# Patient Record
Sex: FEMALE | Race: WHITE | Employment: FULL TIME | URBAN - METROPOLITAN AREA
[De-identification: names, ages, dates, MRNs, and addresses within clinical notes are randomized per-mention and may not be internally consistent; named-entity substitution may affect disease eponyms.]

---

## 2021-01-02 ENCOUNTER — VIRTUAL VISIT (OUTPATIENT)
Dept: PRIMARY CARE CLINIC | Age: 56
End: 2021-01-02

## 2021-01-02 DIAGNOSIS — Z20.822 EXPOSURE TO COVID-19 VIRUS: Primary | ICD-10-CM

## 2021-01-02 PROCEDURE — 99213 OFFICE O/P EST LOW 20 MIN: CPT | Performed by: PHYSICIAN ASSISTANT

## 2021-01-02 NOTE — PROGRESS NOTES
Subjective:      Patient ID: Severiano Paula is a 63 y.o. female who presents today for:  Chief Complaint   Patient presents with    Concern For COVID-19       Pt consents to this telephone/video encounter and understands that examination is limited due to technology. 15 minutes were spent reviewing patient's records, test results, medications and allergies as well as ROS, counseling pt and explaining necessary plan of care during encounter. Pt endorses body aches, chills, mild HA x 2 days  Picked up son on Monday w/sx no mask, ? Exposure - after travel out of the country  No SOB, cough        Past Medical History:   Diagnosis Date    Anxiety     Chicken pox     Chronic back pain     Hypertension      Past Surgical History:   Procedure Laterality Date    SPINE SURGERY  1981    spinal fusion     No family history on file.   Social History     Socioeconomic History    Marital status:      Spouse name: Not on file    Number of children: Not on file    Years of education: Not on file    Highest education level: Not on file   Occupational History    Not on file   Social Needs    Financial resource strain: Not on file    Food insecurity     Worry: Not on file     Inability: Not on file    Transportation needs     Medical: Not on file     Non-medical: Not on file   Tobacco Use    Smoking status: Never Smoker   Substance and Sexual Activity    Alcohol use: Not on file    Drug use: Not on file    Sexual activity: Not on file   Lifestyle    Physical activity     Days per week: Not on file     Minutes per session: Not on file    Stress: Not on file   Relationships    Social connections     Talks on phone: Not on file     Gets together: Not on file     Attends Congregational service: Not on file     Active member of club or organization: Not on file     Attends meetings of clubs or organizations: Not on file     Relationship status: Not on file    Intimate partner violence Fear of current or ex partner: Not on file     Emotionally abused: Not on file     Physically abused: Not on file     Forced sexual activity: Not on file   Other Topics Concern    Not on file   Social History Narrative    Not on file     Current Outpatient Medications on File Prior to Visit   Medication Sig Dispense Refill    losartan (COZAAR) 25 MG tablet Take 25 mg by mouth daily      citalopram (CELEXA) 20 MG tablet Take 20 mg by mouth daily       No current facility-administered medications on file prior to visit. Patient has no known allergies. Review of Systems   Constitutional: Positive for chills. Respiratory: Negative for cough and shortness of breath. Neurological: Positive for headaches. All other systems reviewed and are negative. Objective: There were no vitals taken for this visit. Physical Exam  Vitals reviewed: exam deferred 2/2 telemedicine encounter. Assessment:       Diagnosis Orders   1. Exposure to COVID-19 virus         Plan:      Orders Placed This Encounter   Procedures    Covid-19 Ambulatory       No orders of the defined types were placed in this encounter. Return for follow up w/PCP in 1-2 weeks. Reviewed with the patient: current clinicalstatus, medications, activities and diet. Side effects, adverse effects of the medication prescribedtoday, as well as treatment plan/ rationale and result expectations have been discussedwith the patient who expresses understanding and desires to proceed. Close follow upto evaluate treatment results and for coordination of care. I have reviewedthe patient's medical history in detail and updated the computerized patient record.     Pat Hunter PA-C

## 2021-01-05 LAB
SARS-COV-2: NOT DETECTED
SOURCE: NORMAL

## 2021-01-22 ASSESSMENT — ENCOUNTER SYMPTOMS
COUGH: 0
SHORTNESS OF BREATH: 0

## 2023-02-27 ENCOUNTER — HOSPITAL ENCOUNTER (OUTPATIENT)
Dept: WOMENS IMAGING | Age: 58
Discharge: HOME | End: 2023-03-01
Payer: COMMERCIAL

## 2023-02-27 DIAGNOSIS — Z12.31 ENCOUNTER FOR SCREENING MAMMOGRAM FOR MALIGNANT NEOPLASM OF BREAST: ICD-10-CM

## 2023-02-27 PROCEDURE — 77063 BREAST TOMOSYNTHESIS BI: CPT

## 2024-03-22 ENCOUNTER — OFFICE VISIT (OUTPATIENT)
Dept: FAMILY MEDICINE CLINIC | Age: 59
End: 2024-03-22
Payer: COMMERCIAL

## 2024-03-22 VITALS
DIASTOLIC BLOOD PRESSURE: 78 MMHG | HEIGHT: 68 IN | HEART RATE: 78 BPM | SYSTOLIC BLOOD PRESSURE: 132 MMHG | WEIGHT: 134 LBS | BODY MASS INDEX: 20.31 KG/M2 | TEMPERATURE: 97.1 F | OXYGEN SATURATION: 98 %

## 2024-03-22 DIAGNOSIS — G89.29 CHRONIC LEFT-SIDED LOW BACK PAIN WITH LEFT-SIDED SCIATICA: Primary | ICD-10-CM

## 2024-03-22 DIAGNOSIS — M54.42 CHRONIC LEFT-SIDED LOW BACK PAIN WITH LEFT-SIDED SCIATICA: Primary | ICD-10-CM

## 2024-03-22 PROCEDURE — G8484 FLU IMMUNIZE NO ADMIN: HCPCS | Performed by: PHYSICIAN ASSISTANT

## 2024-03-22 PROCEDURE — 96372 THER/PROPH/DIAG INJ SC/IM: CPT | Performed by: PHYSICIAN ASSISTANT

## 2024-03-22 PROCEDURE — 3017F COLORECTAL CA SCREEN DOC REV: CPT | Performed by: PHYSICIAN ASSISTANT

## 2024-03-22 PROCEDURE — 99203 OFFICE O/P NEW LOW 30 MIN: CPT | Performed by: PHYSICIAN ASSISTANT

## 2024-03-22 PROCEDURE — G8420 CALC BMI NORM PARAMETERS: HCPCS | Performed by: PHYSICIAN ASSISTANT

## 2024-03-22 PROCEDURE — 1036F TOBACCO NON-USER: CPT | Performed by: PHYSICIAN ASSISTANT

## 2024-03-22 PROCEDURE — G8427 DOCREV CUR MEDS BY ELIG CLIN: HCPCS | Performed by: PHYSICIAN ASSISTANT

## 2024-03-22 RX ORDER — CYCLOBENZAPRINE HCL 5 MG
TABLET ORAL
Qty: 30 TABLET | Refills: 0 | Status: SHIPPED | OUTPATIENT
Start: 2024-03-22

## 2024-03-22 RX ORDER — OLMESARTAN MEDOXOMIL AND HYDROCHLOROTHIAZIDE 20/12.5 20; 12.5 MG/1; MG/1
1 TABLET ORAL DAILY
COMMUNITY
Start: 2023-08-08 | End: 2024-08-07

## 2024-03-22 RX ORDER — KETOROLAC TROMETHAMINE 30 MG/ML
60 INJECTION, SOLUTION INTRAMUSCULAR; INTRAVENOUS ONCE
Status: COMPLETED | OUTPATIENT
Start: 2024-03-22 | End: 2024-03-22

## 2024-03-22 RX ORDER — AMITRIPTYLINE HYDROCHLORIDE 25 MG/1
25 TABLET, FILM COATED ORAL NIGHTLY
COMMUNITY
Start: 2023-05-16

## 2024-03-22 RX ORDER — ESTRADIOL 0.1 MG/G
1 CREAM VAGINAL
COMMUNITY
Start: 2023-05-09

## 2024-03-22 RX ORDER — PREDNISONE 10 MG/1
TABLET ORAL
Qty: 51 TABLET | Refills: 0 | Status: SHIPPED | OUTPATIENT
Start: 2024-03-22 | End: 2024-04-01

## 2024-03-22 RX ORDER — GABAPENTIN 100 MG/1
100 CAPSULE ORAL
COMMUNITY

## 2024-03-22 RX ADMIN — KETOROLAC TROMETHAMINE 60 MG: 30 INJECTION, SOLUTION INTRAMUSCULAR; INTRAVENOUS at 15:51

## 2024-03-22 SDOH — ECONOMIC STABILITY: FOOD INSECURITY: WITHIN THE PAST 12 MONTHS, YOU WORRIED THAT YOUR FOOD WOULD RUN OUT BEFORE YOU GOT MONEY TO BUY MORE.: NEVER TRUE

## 2024-03-22 SDOH — ECONOMIC STABILITY: INCOME INSECURITY: HOW HARD IS IT FOR YOU TO PAY FOR THE VERY BASICS LIKE FOOD, HOUSING, MEDICAL CARE, AND HEATING?: NOT HARD AT ALL

## 2024-03-22 SDOH — ECONOMIC STABILITY: FOOD INSECURITY: WITHIN THE PAST 12 MONTHS, THE FOOD YOU BOUGHT JUST DIDN'T LAST AND YOU DIDN'T HAVE MONEY TO GET MORE.: NEVER TRUE

## 2024-03-22 SDOH — ECONOMIC STABILITY: HOUSING INSECURITY
IN THE LAST 12 MONTHS, WAS THERE A TIME WHEN YOU DID NOT HAVE A STEADY PLACE TO SLEEP OR SLEPT IN A SHELTER (INCLUDING NOW)?: NO

## 2024-03-22 ASSESSMENT — PATIENT HEALTH QUESTIONNAIRE - PHQ9
5. POOR APPETITE OR OVEREATING: NOT AT ALL
2. FEELING DOWN, DEPRESSED OR HOPELESS: NOT AT ALL
1. LITTLE INTEREST OR PLEASURE IN DOING THINGS: NOT AT ALL
SUM OF ALL RESPONSES TO PHQ QUESTIONS 1-9: 0
SUM OF ALL RESPONSES TO PHQ QUESTIONS 1-9: 0
10. IF YOU CHECKED OFF ANY PROBLEMS, HOW DIFFICULT HAVE THESE PROBLEMS MADE IT FOR YOU TO DO YOUR WORK, TAKE CARE OF THINGS AT HOME, OR GET ALONG WITH OTHER PEOPLE: NOT DIFFICULT AT ALL
SUM OF ALL RESPONSES TO PHQ QUESTIONS 1-9: 0
8. MOVING OR SPEAKING SO SLOWLY THAT OTHER PEOPLE COULD HAVE NOTICED. OR THE OPPOSITE, BEING SO FIGETY OR RESTLESS THAT YOU HAVE BEEN MOVING AROUND A LOT MORE THAN USUAL: NOT AT ALL
3. TROUBLE FALLING OR STAYING ASLEEP: NOT AT ALL
9. THOUGHTS THAT YOU WOULD BE BETTER OFF DEAD, OR OF HURTING YOURSELF: NOT AT ALL
6. FEELING BAD ABOUT YOURSELF - OR THAT YOU ARE A FAILURE OR HAVE LET YOURSELF OR YOUR FAMILY DOWN: NOT AT ALL
SUM OF ALL RESPONSES TO PHQ9 QUESTIONS 1 & 2: 0
4. FEELING TIRED OR HAVING LITTLE ENERGY: NOT AT ALL
SUM OF ALL RESPONSES TO PHQ QUESTIONS 1-9: 0
7. TROUBLE CONCENTRATING ON THINGS, SUCH AS READING THE NEWSPAPER OR WATCHING TELEVISION: NOT AT ALL

## 2024-03-22 NOTE — PROGRESS NOTES
After obtaining consent, and per orders of Dominic Boyce, injection of Toradol 60  given in Right upper quad. gluteus by Tamie Hamlin MA. Patient instructed to remain in clinic for 20 minutes afterwards, and to report any adverse reaction to me immediately.    
Conjunctiva/sclera: Conjunctivae normal.      Pupils: Pupils are equal, round, and reactive to light.   Cardiovascular:      Rate and Rhythm: Normal rate and regular rhythm.      Pulses: Normal pulses.      Heart sounds: Normal heart sounds, S1 normal and S2 normal.   Pulmonary:      Effort: Pulmonary effort is normal.      Breath sounds: Normal breath sounds and air entry.   Musculoskeletal:      Cervical back: Normal range of motion.   Skin:     General: Skin is warm.      Capillary Refill: Capillary refill takes less than 2 seconds.   Neurological:      Mental Status: She is alert and oriented to person, place, and time.      Gait: Gait is intact.   Psychiatric:         Attention and Perception: Attention normal.         Mood and Affect: Mood normal.         Speech: Speech normal.         Behavior: Behavior normal. Behavior is cooperative.       READY CARE COURSE   Labs:  No results found for this visit on 03/22/24.  IMAGING:  No orders to display     Scheduled Meds:  Continuous Infusions:  PRN Meds:.  PROCEDURES:  FINAL IMPRESSION      1. Chronic left-sided low back pain with left-sided sciatica      DISPOSITION/PLAN   Chronic left sided pain. Patient had fusion of lower back. Intermittent chronic back pain. Patient needs medication and pain relief for trip to [place]. Patient will be injected with toradol 60 mg today with prednisone taper starting tomorrow. Muscle relaxer as needed. Continue gentle ROM exercises which were provided to the patient. Patient is to continue activities as tolerated.         PATIENT REFERRED TO:  No follow-ups on file.    DISCHARGE MEDICATIONS:  New Prescriptions    CYCLOBENZAPRINE (FLEXERIL) 5 MG TABLET    Take 1 tablet by mouth nightly. If no improvement can do two tablets nightly.    PREDNISONE (DELTASONE) 10 MG TABLET    Take 6 tablets by mouth daily for 6 days, THEN 5 tablets daily for 1 day, THEN 4 tablets daily for 1 day, THEN 3 tablets daily for 1 day, THEN 2 tablets daily for 1

## 2024-03-25 ASSESSMENT — ENCOUNTER SYMPTOMS
COUGH: 0
BOWEL INCONTINENCE: 0
SINUS PAIN: 0
SORE THROAT: 0
ABDOMINAL PAIN: 0
SINUS PRESSURE: 0
DIARRHEA: 0
VOMITING: 0
NAUSEA: 0
CHEST TIGHTNESS: 0
SHORTNESS OF BREATH: 0

## 2024-03-25 ASSESSMENT — VISUAL ACUITY: OU: 1

## 2024-04-08 ENCOUNTER — HOSPITAL ENCOUNTER (OUTPATIENT)
Dept: PHYSICAL THERAPY | Age: 59
Setting detail: THERAPIES SERIES
Discharge: HOME | End: 2024-04-08
Payer: COMMERCIAL

## 2024-04-08 PROCEDURE — 97161 PT EVAL LOW COMPLEX 20 MIN: CPT

## 2024-04-08 PROCEDURE — 97110 THERAPEUTIC EXERCISES: CPT

## 2024-04-08 ASSESSMENT — PAIN DESCRIPTION - PAIN TYPE: TYPE: ACUTE PAIN

## 2024-04-08 ASSESSMENT — PAIN DESCRIPTION - DESCRIPTORS: DESCRIPTORS: SHOOTING;TIGHTNESS

## 2024-04-08 ASSESSMENT — PAIN DESCRIPTION - ORIENTATION: ORIENTATION: LEFT;LOWER

## 2024-04-08 ASSESSMENT — PAIN DESCRIPTION - LOCATION: LOCATION: BACK

## 2024-04-08 ASSESSMENT — PAIN SCALES - GENERAL: PAINLEVEL_OUTOF10: 0

## 2024-04-08 NOTE — THERAPY EVALUATION
[facility]  PHYSICAL THERAPY PLAN OF CARE                                    [facility] [place] [address]     Ph: [phone]  Fax: [phone]    [] Certification  [] Recertification [x]  Plan of Care  [] Progress Note [] Discharge      Referring Provider: Landy Simpson MD    From:  Desiree Maria, PT, DPT    Patient: Judy Yang (58 y.o. female) : 1965 Date: 2024   Medical Diagnosis: Radiculopathy, lumbosacral region [M54.17]  Arthrodesis status [Z98.1]    Treatment Diagnosis: Radiating LBP, reduced strength    Plan of Care/Certification Expiration Date:     Progress Report Period from: 2024  to 2024    Visits to Date: 1 No Show:   Cancelled Appts:      OBJECTIVE:     Long Term Goals - Time Frame for Long Term Goals : 4 weeks  Goals Current/ Discharge status Status   Long Term Goal 1: Patient will demonstrate 5/5 strength in bilateral hips to improve WBing tolerance. Strength LLE  L Hip Flexion: 3/5  L Hip Extension: 3/5  L Hip ABduction: 3+/5  L Hip Internal Rotation: 5/5  L Hip External Rotation: 5/5  L Knee Flexion: 5/5  L Knee Extension: 5/5  L Ankle Dorsiflexion: 5/5  Strength RLE  R Hip Flexion: 3+/5  R Hip Extension: 4-/5  R Hip ABduction: 4-/5  R Hip Internal Rotation: 5/5  R Hip External Rotation: 5/5  R Knee Flexion: 5/5  R Knee Extension: 5/5  R Ankle Dorsiflexion: 5/5  New   Long Term Goal 2: Patient will report being able tolerate full shift at work without increase in pain to allow for return to normal activities. Reports increased pain when doing a lot and being at work New   Long Term Goal 3: Patient will report </=45-50 on BIJAL to demonstrate improved subjective functional mobility.  New     Body Structures, Functions, Activity Limitations Requiring Skilled Therapeutic Intervention: Decreased body mechanics, Decreased tolerance to work activity, Decreased strength, Increased pain  Assessment: Patient is a 57 yo female presenting to skilled PT

## 2024-04-11 ENCOUNTER — HOSPITAL ENCOUNTER (OUTPATIENT)
Dept: PHYSICAL THERAPY | Age: 59
Setting detail: THERAPIES SERIES
Discharge: HOME | End: 2024-04-11
Payer: COMMERCIAL

## 2024-04-11 PROCEDURE — 97110 THERAPEUTIC EXERCISES: CPT

## 2024-04-11 NOTE — PROGRESS NOTES
[facility]  Outpatient Physical Therapy    Treatment Note        Date: 2024  Patient: Judy Yang  : 1965   Confirmed: Yes  MRN: [id number]  Referring Provider: Landy Simpson MD    Medical Diagnosis: Radiculopathy, lumbosacral region [M54.17]  Arthrodesis status [Z98.1]       Treatment Diagnosis: Radiating LBP, reduced strength    Visit Information:  Insurance: Payor: MED[employer] M[employer]UAL / Plan: MEDICAL MUTUAL ACCESS / Product Type: *No Product type* /   PT Visit Information  Onset Date: 24  PT Insurance Information: Medical [place]  Total # of Visits Approved:  (BMN)  Total # of Visits to Date: 2  No Show: 0  Canceled Appointment: 0  Progress Note Counter:     Subjective Information:  Subjective: Pt states the stretches are helping alot  HEP Compliance:  [x] Good [] Fair [] Poor [] Reports not doing due to:  Pain Screening  Patient Currently in Pain: Denies    Treatment:  Exercises:  Exercises  Exercise 1: bridge with RTB x15 reps  Exercise 2: piriformis str (pushing & opp foot off table) 3 reps x 20 sec holds, lorenzo  Exercise 3: H/L march RTB x15 reps, lorenzo  Exercise 4: clamshell with abd RTB x15 reps ; reverse x15 reps, lorenzo  Exercise 5: SLR x15 reps, lorenzo  Exercise 6: LTR 10 reps x 5 sec holds, lorenzo  Exercise 7: Hip hike x 10  Exercise 8: roll to control 10/10/10 x 10  Exercise 9: hip abd/ extension x 15  Exercise 11: step-ups fwd x10  Exercise 20: HEP: cont current +hip hikes, ext, abd, Roll for control       Manual: NA          Modalities:NA          *Indicates exercise, modality, or manual techniques to be initiated when appropriate    Objective Measures: : LTG#1, Progressed strengthening     Strength: [x] NT  [] MMT completed:   ROM: [] NT  [] ROM measurements:      Assessment:   Body Structures, Functions, Activity Limitations Requiring Skilled Therapeutic Intervention: Decreased body mechanics, Decreased tolerance to work activity, Decreased strength, Increased

## 2024-04-12 ENCOUNTER — OFFICE VISIT (OUTPATIENT)
Dept: FAMILY MEDICINE CLINIC | Age: 59
End: 2024-04-12
Payer: COMMERCIAL

## 2024-04-12 VITALS
OXYGEN SATURATION: 99 % | RESPIRATION RATE: 18 BRPM | HEIGHT: 67 IN | WEIGHT: 130.95 LBS | HEART RATE: 85 BPM | SYSTOLIC BLOOD PRESSURE: 126 MMHG | BODY MASS INDEX: 20.55 KG/M2 | DIASTOLIC BLOOD PRESSURE: 80 MMHG

## 2024-04-12 DIAGNOSIS — R42 DIZZINESS: Primary | ICD-10-CM

## 2024-04-12 PROCEDURE — G8427 DOCREV CUR MEDS BY ELIG CLIN: HCPCS | Performed by: NURSE PRACTITIONER

## 2024-04-12 PROCEDURE — 3017F COLORECTAL CA SCREEN DOC REV: CPT | Performed by: NURSE PRACTITIONER

## 2024-04-12 PROCEDURE — 99213 OFFICE O/P EST LOW 20 MIN: CPT | Performed by: NURSE PRACTITIONER

## 2024-04-12 PROCEDURE — G8420 CALC BMI NORM PARAMETERS: HCPCS | Performed by: NURSE PRACTITIONER

## 2024-04-12 PROCEDURE — 1036F TOBACCO NON-USER: CPT | Performed by: NURSE PRACTITIONER

## 2024-04-12 RX ORDER — MECLIZINE HYDROCHLORIDE 25 MG/1
25 TABLET ORAL 3 TIMES DAILY PRN
Qty: 21 TABLET | Refills: 0 | Status: SHIPPED | OUTPATIENT
Start: 2024-04-12 | End: 2024-04-19

## 2024-04-12 ASSESSMENT — ENCOUNTER SYMPTOMS
VOMITING: 0
ABDOMINAL PAIN: 0
COUGH: 0
NAUSEA: 0
DIARRHEA: 0

## 2024-04-17 ENCOUNTER — HOSPITAL ENCOUNTER (OUTPATIENT)
Dept: PHYSICAL THERAPY | Age: 59
Setting detail: THERAPIES SERIES
Discharge: HOME | End: 2024-04-17
Payer: COMMERCIAL

## 2024-04-17 PROCEDURE — 97110 THERAPEUTIC EXERCISES: CPT

## 2024-04-17 NOTE — PROGRESS NOTES
[facility]  Outpatient Physical Therapy   Treatment Note        Date: 2024  Patient: Judy Yang  : 1965   Confirmed: Yes  MRN: [id number]  Referring Provider: Landy Simpson MD      Medical Diagnosis: Radiculopathy, lumbosacral region [M54.17]  Arthrodesis status [Z98.1]      Treatment Diagnosis: Radiating LBP, reduced strength    Visit Information:  Insurance: Payor: MED[employer] M[employer]UAL / Plan: MEDICAL MUTUAL ACCESS / Product Type: *No Product type* /   PT Visit Information  Onset Date: 24  PT Insurance Information: Medical [place]  Total # of Visits Approved:  (BMN)  Total # of Visits to Date: 3  No Show: 0  Canceled Appointment: 0  Progress Note Counter: 3/4-8    Subjective Information:  Subjective: Pt states no pain just tight. Exercises are going well.  HEP Compliance:  [x] Good [] Fair [] Poor [] Reports not doing due to:               Pain Screening  Patient Currently in Pain: Denies    Treatment:  Exercises:  Exercises  Exercise 1: bridge with RTB x20 reps  Exercise 2: piriformis str (pushing & opp foot off table) 3 reps x 20 sec holds, lorenzo  Exercise 3: H/L march RTB x20 reps, lorenzo  Exercise 4: clamshell with abd RTB x20 reps ; reverse x20 reps, lorenzo  Exercise 5: SLR x15 reps, lorenzo  Exercise 6: LTR 10 reps x 5 sec holds, lorenzo  Exercise 8: roll to control 10/10/10 x 10  Exercise 12: quadruped LE & UE/LE x15  Exercise 20: HEP: cont current +SLR, 4pt LE & UE/LE       *Indicates exercise, modality, or manual techniques to be initiated when appropriate    Objective Measures:             NT     Assessment:   Body Structures, Functions, Activity Limitations Requiring Skilled Therapeutic Intervention: Decreased body mechanics, Decreased tolerance to work activity, Decreased strength, Increased pain  Assessment: Pt continue to progress current exercises with focus on LE  & Core strengthening. Increased all exercises except SLR and added 4 pt & HS stretch all with good tolerance. Instructed

## 2024-04-22 ENCOUNTER — HOSPITAL ENCOUNTER (OUTPATIENT)
Dept: PHYSICAL THERAPY | Age: 59
Setting detail: THERAPIES SERIES
Discharge: HOME | End: 2024-04-22
Payer: COMMERCIAL

## 2024-04-22 NOTE — PROGRESS NOTES
Therapy                            Cancellation/No-show Note    Date: 2024  Patient: Judy Yang (58 y.o. female)  : 1965  MRN:  [id number]  Referring Physician: Landy Simpson MD    Medical Diagnosis: Radiculopathy, lumbosacral region [M54.17]  Arthrodesis status [Z98.1]      Visit Information:  Insurance: Payor: MED[employer]UAL / Plan: MEDICAL MUTUAL ACCESS / Product Type: *No Product type* /   Visits to Date: 3   No Show/Cancelled Appts: 0 /       For today's appointment patient:  [x]  Cancelled  []  Rescheduled appointment  []  No-show   []  Called pt to remind of next appointment     Reason given by patient:  []  Patient ill  [x]  Conflicting appointment  []  No transportation    []  Conflict with work  []  No reason given  [x]  Other:  States she will call back to reschedule    [] Pt has future appointments scheduled, no follow up needed  [] Pt requests to be on hold.    Reason:   If > 2 weeks please discuss with therapist.  [] Therapist to call pt for follow up     Comments:       Signature: Electronically signed by Ethel Jeff PTA on 24 at 8:34 AM EDT

## 2024-04-24 ENCOUNTER — HOSPITAL ENCOUNTER (OUTPATIENT)
Dept: PHYSICAL THERAPY | Age: 59
Setting detail: THERAPIES SERIES
Discharge: HOME | End: 2024-04-24
Payer: COMMERCIAL

## 2024-04-24 PROCEDURE — 97110 THERAPEUTIC EXERCISES: CPT

## 2024-04-24 ASSESSMENT — PAIN SCALES - GENERAL: PAINLEVEL_OUTOF10: 4

## 2024-04-24 ASSESSMENT — PAIN DESCRIPTION - DESCRIPTORS: DESCRIPTORS: TIGHTNESS

## 2024-04-24 ASSESSMENT — PAIN DESCRIPTION - LOCATION: LOCATION: HIP;BACK

## 2024-04-24 ASSESSMENT — PAIN DESCRIPTION - ORIENTATION: ORIENTATION: LEFT

## 2024-04-24 NOTE — PROGRESS NOTES
[facility]  Outpatient Physical Therapy   Treatment Note        Date: 2024  Patient: Judy Yang  : 1965   Confirmed: Yes  MRN: [id number]  Referring Provider: Landy Simpson MD      Medical Diagnosis: Radiculopathy, lumbosacral region [M54.17]  Arthrodesis status [Z98.1]      Treatment Diagnosis: Radiating LBP, reduced strength    Visit Information:  Insurance: Payor: MEDICAL M[employer]UAL / Plan: MEDICAL MUTUAL ACCESS / Product Type: *No Product type* /   PT Visit Information  Onset Date: 24  PT Insurance Information: Medical [place]  Total # of Visits Approved:  (BMN)  Total # of Visits to Date: 4  No Show: 0  Canceled Appointment: 1  Progress Note Counter: -    Subjective Information:  Subjective: Pt reports Pain decreased to 3-4/10 \"Tightness\" ; reports compliance with HEP completing 2x a day, reports feeling good after doing exercises. States she is going to take a leave from work, but is retiring in August.  HEP Compliance:  [x] Good [] Fair [] Poor [] Reports not doing due to:    Pain Screening  Patient Currently in Pain: Yes  Pain Level: 4  Pain Location: Hip, Back  Pain Orientation: Left  Pain Descriptors: Tightness    Treatment:  Exercises:  Exercises  Exercise 2: piriformis str (pushing & opp foot off table) 3 reps x 20 sec holds, lorenzo  Exercise 3: H/L march RTB x20 reps, lorenzo  Exercise 4: clamshell with abd RTB 2x15 reps ; reverse 2x15 reps, lorenzo  Exercise 6: LTR 10 reps x 5 sec holds, lorenzo  Exercise 9: steamboats YTB x10 reps, lorenzo  Exercise 11: 6\" fwd / lat / retro step-ups x 15 reps, each  Exercise 13: Seated HS str 20 sec x 3 L  Exercise 20: HEP: cont current + steamboats    *Indicates exercise, modality, or manual techniques to be initiated when appropriate    Objective Measures:      LTG 2 Current Status:: : reports she has not been back to school since spring break and is taking a leave for the rest of the year. Able to tolerate standing  / walking for about 30

## 2024-04-26 ENCOUNTER — HOSPITAL ENCOUNTER (OUTPATIENT)
Dept: PHYSICAL THERAPY | Age: 59
Setting detail: THERAPIES SERIES
Discharge: HOME | End: 2024-04-26
Payer: COMMERCIAL

## 2024-04-26 PROCEDURE — 97110 THERAPEUTIC EXERCISES: CPT

## 2024-04-26 NOTE — PROGRESS NOTES
[facility]  Outpatient Physical Therapy   Treatment Note        Date: 2024  Patient: Judy Yang  : 1965   Confirmed: Yes  MRN: [id number]  Referring Provider: Landy Simpson MD      Medical Diagnosis: Radiculopathy, lumbosacral region [M54.17]  Arthrodesis status [Z98.1]      Treatment Diagnosis: Radiating LBP, reduced strength    Visit Information:  Insurance: Payor: MEDICAL M[employer]UAL / Plan: MEDICAL MUTUAL ACCESS / Product Type: *No Product type* /   PT Visit Information  Onset Date: 24  PT Insurance Information: Medical [place]  Total # of Visits Approved:  (BMN)  Total # of Visits to Date: 5  No Show: 0  Canceled Appointment: 1  Progress Note Counter:     Subjective Information:  Subjective: Patient reports she felt irritated all day after last visit, so she took a day off and the day after only did her exercises 1x on thursday and took a 45 min walk, with no pain. States she was stretching her hamstring throughout the day and felt really good.  HEP Compliance:  [x] Good [] Fair [] Poor [] Reports not doing due to:    Pain Screening  Patient Currently in Pain: Denies    Treatment:  Exercises:  Exercises  Exercise 2: seated piriformis str 3 reps x 20 sec holds  Exercise 7: Hip hike x10 x2# on foot, lorenzo  Exercise 8: ITB str standing 3 reps x 20 sec holds, lorenzo  Exercise 9: steamboats YTB x10 reps, lorenzo  Exercise 11: 6\" fwd / lat / retro step-ups x 15 reps, each, lorenzo  Exercise 13: Seated HS str with stool  20 sec x 3 ,lorenzo  Exercise 20: HEP: cont current    Manual:   Manual Therapy  Other: IDN education provided and consent signed; IDN (5 minutes) to decrease inflammation and improve MS mechanics; see body diagram for points (to be scanned upon D/C) performed by Desiree Maria PT    *Indicates exercise, modality, or manual techniques to be initiated when appropriate    Objective Measures:     Strength RLE  R Hip Flexion: 5/5  R Hip Extension: 4/5  R Hip ABduction: 4/5  R Hip

## 2024-04-30 ENCOUNTER — HOSPITAL ENCOUNTER (OUTPATIENT)
Dept: PHYSICAL THERAPY | Age: 59
Setting detail: THERAPIES SERIES
Discharge: HOME | End: 2024-04-30
Payer: COMMERCIAL

## 2024-04-30 PROCEDURE — 97110 THERAPEUTIC EXERCISES: CPT

## 2024-04-30 ASSESSMENT — PAIN SCALES - GENERAL: PAINLEVEL_OUTOF10: 3

## 2024-04-30 ASSESSMENT — PAIN DESCRIPTION - ORIENTATION: ORIENTATION: LOWER

## 2024-04-30 ASSESSMENT — PAIN DESCRIPTION - LOCATION: LOCATION: BACK

## 2024-04-30 ASSESSMENT — PAIN DESCRIPTION - PAIN TYPE: TYPE: CHRONIC PAIN

## 2024-04-30 NOTE — PROGRESS NOTES
[facility]  Outpatient Physical Therapy    Treatment Note        Date: 2024  Patient: Judy Yang  : 1965   Confirmed: Yes  MRN: [id number]  Referring Provider: Landy Simpson MD    Medical Diagnosis: Radiculopathy, lumbosacral region [M54.17]  Arthrodesis status [Z98.1]       Treatment Diagnosis: Radiating LBP, reduced strength    Visit Information:  Insurance: Payor: MEDICAL MUTUAL / Plan: MEDICAL MUTUAL ACCESS / Product Type: *No Product type* /   PT Visit Information  Onset Date: 24  PT Insurance Information: Medical [place]  Total # of Visits Approved:  (BMN)  Total # of Visits to Date: 6  No Show: 0  Canceled Appointment: 1  Progress Note Counter:     Subjective Information:  Subjective: Patient reports relief following IDN last visit. States she has been gradually increasing her daily walks. Continues to c/o left hamstring tightness.  HEP Compliance:  [x] Good [] Fair [] Poor [] Reports not doing due to:    Pain Screening  Patient Currently in Pain: Yes  Pain Assessment: 0-10  Pain Level: 3  Pain Type: Chronic pain  Pain Location: Back  Pain Orientation: Lower    Treatment:  Exercises:  Exercises  Exercise 2: figure 4 piriformis str 3 reps x 20 sec holds  Exercise 6: paloff press RTB x10  Exercise 7: Hip hike x10 x2# on foot, lorenzo  Exercise 9: steamboats YTB x15 reps, lorenzo  Exercise 10: lateral monster walks YTB length // bars x3, high march with hold 2# ankle weight x3  Exercise 11: 6\" fwd / lat / retro step-ups x 15 reps, each, lorenzo  Exercise 12: runners step up 6\" x10  Exercise 20: HEP: cont current   Assessment:   Body Structures, Functions, Activity Limitations Requiring Skilled Therapeutic Intervention: Decreased body mechanics, Decreased tolerance to work activity, Decreased strength, Increased pain  Assessment: Continued exercises progressions with focus on for B hip strength. Patient challenged by SLS activity. She fatigued quickly with lateral monster walks. No

## 2024-05-02 ENCOUNTER — HOSPITAL ENCOUNTER (OUTPATIENT)
Dept: PHYSICAL THERAPY | Age: 59
Setting detail: THERAPIES SERIES
Discharge: HOME | End: 2024-05-02
Payer: COMMERCIAL

## 2024-05-02 PROCEDURE — 97140 MANUAL THERAPY 1/> REGIONS: CPT

## 2024-05-02 PROCEDURE — 97110 THERAPEUTIC EXERCISES: CPT

## 2024-05-02 ASSESSMENT — PAIN DESCRIPTION - ORIENTATION: ORIENTATION: LEFT

## 2024-05-02 ASSESSMENT — PAIN SCALES - GENERAL: PAINLEVEL_OUTOF10: 3

## 2024-05-02 ASSESSMENT — PAIN DESCRIPTION - DESCRIPTORS: DESCRIPTORS: TIGHTNESS

## 2024-05-02 ASSESSMENT — PAIN DESCRIPTION - LOCATION: LOCATION: BACK

## 2024-05-02 NOTE — PROGRESS NOTES
[facility]  Outpatient Physical Therapy   Treatment Note        Date: 2024  Patient: Judy Yang  : 1965   Confirmed: Yes  MRN: [id number]  Referring Provider: Landy Simpson MD      Medical Diagnosis: Radiculopathy, lumbosacral region [M54.17]  Arthrodesis status [Z98.1]      Treatment Diagnosis: Radiating LBP, reduced strength    Visit Information:  Insurance: Payor: MEDICAL MUTUAL / Plan: MEDICAL MUTUAL ACCESS / Product Type: *No Product type* /   PT Visit Information  Onset Date: 24  PT Insurance Information: Medical [place]  Total # of Visits Approved:  (BMN)  Total # of Visits to Date: 7  No Show: 0  Canceled Appointment: 1  Progress Note Counter:     Subjective Information:  Subjective: Pt continue to report no pain just tightness left hamstring.  HEP Compliance:  [x] Good [] Fair [] Poor [] Reports not doing due to:               Pain Screening  Patient Currently in Pain: Yes  Pain Level: 3  Pain Location: Back  Pain Orientation: Left  Pain Descriptors: Tightness    Treatment:  Exercises:  Exercises  Exercise 2: figure 4 piriformis str 3 reps x 20 sec holds  Exercise 9: steamboats YTB x20 reps, lorenzo  Exercise 10: lateral monster walks YTB length // bars x3, high march with hold 2# ankle weight x5  Exercise 12: runners step up 6\" x15  Exercise 13: Seated HS str with stool  20 sec x 3 ,lorenzo  Exercise 20: HEP: cont current + increased reps.       Manual:   Manual Therapy  Other: IDN education provided and consent signed; IDN (5 minutes) to decrease inflammation and improve MS mechanics; see body diagram for points (to be scanned upon D/C) performed by Desiree Maria, PT       *Indicates exercise, modality, or manual techniques to be initiated when appropriate    Objective Measures:         LTG 2 Current Status:: 24 Pt reports not working but walked \"agressivly for 55 minute without discomfort.        Assessment:   Body Structures, Functions, Activity Limitations Requiring

## 2024-05-07 ENCOUNTER — HOSPITAL ENCOUNTER (OUTPATIENT)
Dept: PHYSICAL THERAPY | Age: 59
Setting detail: THERAPIES SERIES
Discharge: HOME | End: 2024-05-07
Payer: COMMERCIAL

## 2024-05-07 PROCEDURE — 97110 THERAPEUTIC EXERCISES: CPT

## 2024-05-07 NOTE — PROGRESS NOTES
ABduction: 4/5  L Knee Flexion: 5/5  L Knee Extension: 5/5     LTG 1 Current Status:: 5/7: see strength  LTG 2 Current Status:: 5/02/24 Pt reports not working but walked \"agressivly for 55 minute without discomfort.  LTG 3 Current Status:: 5/7: 14/50 (18/45 at evaluation)    Assessment:   Body Structures, Functions, Activity Limitations Requiring Skilled Therapeutic Intervention: Decreased body mechanics, Decreased tolerance to work activity, Decreased strength, Increased pain  Assessment: Patient is a 59 yo female presenting to skilled PT for low back pain with radicular symptoms, specifically into ITB. Patient has completed 8 visits to date. Objective measures assessed for updated POC. Patient demonstrating improvements towards strength and tolerance to WBing tasks. Patient also reporting 4 point improvement on BIJAL score. Reviewed current activities, with  decreasing stability to improve strength in core, trunk and LEs. Concluded session with IDN for further pain reduction and tissue healing. Continued skilled PT indicated for further progression towards goals and return to normal pain-free function.  Treatment Diagnosis: Radiating LBP, reduced strength  Therapy Prognosis: Excellent, Good  PT Education: Goals, Plan of Care, Home Exercise Program, Body mechanics  Activity Tolerance  Activity Tolerance: Patient tolerated treatment well    Post-Pain Assessment:       Pain Rating (0-10 pain scale):   0/10   Location and pain description same as pre-treatment unless indicated.   Action: [] NA   [x] Perform HEP  [] Meds as prescribed  [] Modalities as prescribed   [] Call Physician     GOALS   Patient Goal(s): Patient Goals : Reduce pain, return to PLOF    Long Term Goals Completed by 4 weeks Goal Status   LTG 1 Patient will demonstrate 5/5 strength in bilateral hips to improve WBing tolerance. In progress   LTG 2 Patient will report being able tolerate full shift at work without increase in pain to allow for return to

## 2024-05-07 NOTE — PLAN OF CARE
[facility]  PHYSICAL THERAPY PLAN OF CARE                                    [place] [address]     Ph: [phone]  Fax: [phone]    [] Certification  [] Recertification [x]  Plan of Care  [x] Progress Note [] Discharge      Referring Provider: Landy Simpson MD    From:  Desiree Maria, PT,DPT    Patient: Judy Yang (58 y.o. female) : 1965 Date: 2024   Medical Diagnosis: Radiculopathy, lumbosacral region [M54.17]  Arthrodesis status [Z98.1]    Treatment Diagnosis: Radiating LBP, reduced strength    Plan of Care/Certification Expiration Date:     Progress Report Period from: 2024  to 2024    Visits to Date: 8 No Show: 0 Cancelled Appts: 1    OBJECTIVE:   Long Term Goals - Time Frame for Long Term Goals : 4 weeks  Goals Current/ Discharge status Status   Long Term Goal 1: Patient will demonstrate 5/5 strength in bilateral hips to improve WBing tolerance. LTG 1 Current Status:: : see strength  Strength LLE  L Hip Flexion: 5/5  L Hip Extension: 4/5  L Hip ABduction: 4/5  L Knee Flexion: 5/5  L Knee Extension: 5/5  Strength RLE  R Hip Flexion: 4+/5  R Hip Extension: 4+/5  R Hip ABduction: 4+/5  R Knee Flexion: 5/5  R Knee Extension: 5/5  In progress   Long Term Goal 2: Patient will report being able tolerate full shift at work without increase in pain to allow for return to normal activities. LTG 2 Current Status:: 24 Pt reports not working but walked \"agressivly for 55 minute without discomfort. In progress   Long Term Goal 3: Patient will report </=-50 on BIJAL to demonstrate improved subjective functional mobility. LTG 3 Current Status:: :  ( at evaluation)   In progress     Body Structures, Functions, Activity Limitations Requiring Skilled Therapeutic Intervention: Decreased body mechanics, Decreased tolerance to work activity, Decreased strength, Increased pain  Assessment: Patient is a 57 yo female presenting to skilled PT for

## 2024-05-10 ENCOUNTER — HOSPITAL ENCOUNTER (OUTPATIENT)
Dept: PHYSICAL THERAPY | Age: 59
Setting detail: THERAPIES SERIES
Discharge: HOME | End: 2024-05-10
Payer: COMMERCIAL

## 2024-05-10 NOTE — PROGRESS NOTES
Therapy                            Cancellation/No-show Note    Date: 05/10/2024  Patient: Judy Yang (58 y.o. female)  : 1965  MRN:  [id number]  Referring Physician: Landy Simpson MD    Medical Diagnosis: Radiculopathy, lumbosacral region [M54.17]  Arthrodesis status [Z98.1]      Visit Information:  Insurance: Payor: [employer] / Plan: [employer] ACCESS / Product Type: *No Product type* /   Visits to Date: 8   No Show/Cancelled Appts: 0 / 2      For today's appointment patient:  [x]  Cancelled  []  Rescheduled appointment  []  No-show   []  Called pt to remind of next appointment     Reason given by patient:  []  Patient ill  []  Conflicting appointment  []  No transportation    []  Conflict with work  []  No reason given  [x]  Other:  patient transitioning to 1x / week    [x] Pt has future appointments scheduled, no follow up needed  [] Pt requests to be on hold.    Reason:   If > 2 weeks please discuss with therapist.  [] Therapist to call pt for follow up     Comments:       Signature: Electronically signed by Desiree Maria PT on 5/10/24 at 8:12 AM EDT

## 2024-05-14 ENCOUNTER — HOSPITAL ENCOUNTER (OUTPATIENT)
Dept: PHYSICAL THERAPY | Age: 59
Setting detail: THERAPIES SERIES
Discharge: HOME | End: 2024-05-14
Payer: COMMERCIAL

## 2024-05-14 PROCEDURE — 97110 THERAPEUTIC EXERCISES: CPT

## 2024-05-14 NOTE — PROGRESS NOTES
[facility]  Outpatient Physical Therapy   Treatment Note        Date: 2024  Patient: Judy Yang  : 1965   Confirmed: Yes  MRN: [id number]  Referring Provider: Landy Simpson MD      Medical Diagnosis: Radiculopathy, lumbosacral region [M54.17]  Arthrodesis status [Z98.1]      Treatment Diagnosis: Radiating LBP, reduced strength    Visit Information:  Insurance: Payor: [employer] / Plan: MED[employer] M[employer] ACCESS / Product Type: *No Product type* /   PT Visit Information  Onset Date: 24  PT Insurance Information: [employer] [place]  Total # of Visits Approved:  (BMN)  Total # of Visits to Date: 9  No Show: 0  Canceled Appointment: 2  Progress Note Counter:     Subjective Information:  Subjective: Patient reports she is doing good. going down to [place] next week. reports she had nerve pain last week in her L hamstring , but nothing currently. Reports compliance with HEP. States she was able to complete a few hours a gardening with only normal back soreness the other day.  HEP Compliance:  [x] Good [] Fair [] Poor [] Reports not doing due to:    Pain Screening  Patient Currently in Pain: Denies    Treatment:  Exercises:  Exercises  Exercise 1: RDL 2 sets x 10 reps x 10#KB  Exercise 6: paloff press RTB x10; with rotation x10 reps, lorenzo  Exercise 11: lift-chop 10 reps x 10#KB, lorenzo  Exercise 12: Bosu ball: fwd / lat runner step-ups x15 reps lorenzo ; fwd / lat lunges x10 reps, lorenzo, each  Exercise 14: stationary bike seat L3 x5 min  Exercise 15: SL iso hip ER and TKE into wall with ball 15 reps x 3-5 sec holds  Exercise 20: HEP: cont current + RDL & lift-chop    Manual:   Manual Therapy  Other: IDN education provided and consent signed; IDN (5 minutes) to decrease inflammation and improve MS mechanics; see body diagram for points (to be scanned upon D/C) performed by Desiree Maria, TURNER    *Indicates exercise, modality, or manual techniques to be initiated when appropriate    Objective Measures:

## 2024-05-16 ENCOUNTER — APPOINTMENT (OUTPATIENT)
Dept: PHYSICAL THERAPY | Age: 59
End: 2024-05-16
Payer: COMMERCIAL

## 2024-05-21 ENCOUNTER — APPOINTMENT (OUTPATIENT)
Dept: PHYSICAL THERAPY | Age: 59
End: 2024-05-21
Payer: COMMERCIAL

## 2024-05-23 ENCOUNTER — HOSPITAL ENCOUNTER (OUTPATIENT)
Dept: PHYSICAL THERAPY | Age: 59
Setting detail: THERAPIES SERIES
Discharge: HOME | End: 2024-05-23
Payer: COMMERCIAL

## 2024-05-23 NOTE — PROGRESS NOTES
Therapy                            Cancellation/No-show Note    Date: 2024  Patient: Judy Yang (58 y.o. female)  : 1965  MRN:  [id number]  Referring Physician: Landy Simpson MD    Medical Diagnosis: Radiculopathy, lumbosacral region [M54.17]  Arthrodesis status [Z98.1]      Visit Information:  Insurance: Payor: [employer] / Plan: MED[employer] ACCESS / Product Type: *No Product type* /   Visits to Date:    No Show/Cancelled Appts: 0 / 3      For today's appointment patient:  [x]  Cancelled  []  Rescheduled appointment  []  No-show   []  Called pt to remind of next appointment     Reason given by patient:  [x]  Patient ill  []  Conflicting appointment  []  No transportation    []  Conflict with work  []  No reason given  []  Other:      [x] Pt has future appointments scheduled, no follow up needed  [] Pt requests to be on hold.    Reason:   If > 2 weeks please discuss with therapist.  [] Therapist to call pt for follow up     Comments:       Signature: Electronically signed by Desiree Maria PT on 24 at 9:24 AM EDT

## 2024-05-31 ENCOUNTER — HOSPITAL ENCOUNTER (OUTPATIENT)
Dept: PHYSICAL THERAPY | Age: 59
Setting detail: THERAPIES SERIES
End: 2024-05-31
Payer: COMMERCIAL

## 2024-05-31 NOTE — PROGRESS NOTES
[facility]  PHYSICAL THERAPY PLAN OF CARE                                    [facility] [place] [address]     Ph: [phone]  Fax: [phone]      [] Certification  [] Recertification []  Plan of Care  [] Progress Note [x] Discharge      Referring Provider: Landy Simpson MD     From:  Desiree Maria PT  Patient: Judy Yang (58 y.o. female) : 1965 Date: 2024  Medical Diagnosis: Radiculopathy, lumbosacral region [M54.17]  Arthrodesis status [Z98.1]       Treatment Diagnosis: Radiating LBP, reduced strength    Plan of Care/Certification Expiration Date: :     Progress Report Period from:  2024  to 2024    Visits to Date: 9 No Show: 1 Cancelled Appts: 4    OBJECTIVE:       Long Term Goals - Time Frame for Long Term Goals : 4 weeks  Goals Current/ Discharge status Status   Long Term Goal 1: Patient will demonstrate 5/5 strength in bilateral hips to improve WBing tolerance. 24:: Strength LLE  L Hip Flexion: 5/5  L Hip Extension: 4/5  L Hip ABduction: 4/5  L Knee Flexion: 5/5  L Knee Extension: 5/5  Strength RLE  R Hip Flexion: 4+/5  R Hip Extension: 4+/5  R Hip ABduction: 4+/5  R Knee Flexion: 5/5  R Knee Extension: 5/5     : Unable to assess d/t d/c    Not Met, Unable to assess   Long Term Goal 2: Patient will report being able tolerate full shift at work without increase in pain to allow for return to normal activities. : Unable to assess d/t d/c  Unable to assess   Long Term Goal 3: Patient will report </=/-50 on BIJAL to demonstrate improved subjective functional mobility. : 14: Unable to assess d/t d/c  Not Met, Unable to assess     Body Structures, Functions, Activity Limitations Requiring Skilled Therapeutic Intervention: Decreased body mechanics, Decreased tolerance to work activity, Decreased strength, Increased pain  Therapy Prognosis: Excellent, Good           PLAN: [] Evaluate and Treat  Frequency/Duration:  Plan Frequency:

## 2024-05-31 NOTE — PROGRESS NOTES
Therapy                            Cancellation/No-show Note      Date: 2024  Patient: Judy Yang (58 y.o. female)  : 1965  MRN:  [id number]  Referring Physician: Landy Simpson MD    Medical Diagnosis: Radiculopathy, lumbosacral region [M54.17]  Arthrodesis status [Z98.1]      Visit Information:  Visits to Date 9   No Show/Cancelled Appts:       For today's appointment patient:  [x]  Cancelled  []  Rescheduled appointment  []  No-show   []  Called pt to remind of next appointment     Reason given by patient:  []  Patient ill  []  Conflicting appointment  []  No transportation    []  Conflict with work  [x]  No reason given  []  Other:      [] Pt has future appointments scheduled, no follow up needed  [] Pt requests to be on hold.    Reason:   If > 2 weeks please discuss with therapist.  [] Therapist to call pt for follow up     Comments:   D/C from PT     Signature: Electronically signed by Emperatriz Wilkerson PTA on 24 at 10:28 AM EDT

## 2024-06-20 ENCOUNTER — HOSPITAL ENCOUNTER (OUTPATIENT)
Dept: WOMENS IMAGING | Age: 59
Discharge: HOME | End: 2024-06-22
Payer: COMMERCIAL

## 2024-06-20 DIAGNOSIS — Z12.31 ENCOUNTER FOR SCREENING MAMMOGRAM FOR MALIGNANT NEOPLASM OF BREAST: ICD-10-CM

## 2024-06-20 PROCEDURE — 77063 BREAST TOMOSYNTHESIS BI: CPT

## 2025-08-14 ENCOUNTER — TRANSCRIBE ORDERS (OUTPATIENT)
Dept: ADMINISTRATIVE | Age: 60
End: 2025-08-14

## 2025-08-14 DIAGNOSIS — Z12.31 ENCOUNTER FOR SCREENING MAMMOGRAM FOR MALIGNANT NEOPLASM OF BREAST: Primary | ICD-10-CM

## 2025-08-21 ENCOUNTER — HOSPITAL ENCOUNTER (OUTPATIENT)
Dept: WOMENS IMAGING | Age: 60
Discharge: HOME | End: 2025-08-23
Payer: COMMERCIAL

## 2025-08-21 DIAGNOSIS — Z12.31 ENCOUNTER FOR SCREENING MAMMOGRAM FOR MALIGNANT NEOPLASM OF BREAST: ICD-10-CM

## 2025-08-21 PROCEDURE — 77063 BREAST TOMOSYNTHESIS BI: CPT
